# Patient Record
Sex: MALE | Race: WHITE | Employment: FULL TIME | URBAN - METROPOLITAN AREA
[De-identification: names, ages, dates, MRNs, and addresses within clinical notes are randomized per-mention and may not be internally consistent; named-entity substitution may affect disease eponyms.]

---

## 2018-12-12 ENCOUNTER — HOSPITAL ENCOUNTER (OUTPATIENT)
Dept: MRI IMAGING | Age: 63
Discharge: HOME | End: 2018-12-12
Attending: PHYSICAL MEDICINE & REHABILITATION
Payer: COMMERCIAL

## 2018-12-12 DIAGNOSIS — Z85.46 PERSONAL HISTORY OF PROSTATE CANCER: ICD-10-CM

## 2018-12-12 DIAGNOSIS — M54.50 LOW BACK PAIN: ICD-10-CM

## 2018-12-12 PROCEDURE — 72148 MRI LUMBAR SPINE W/O DYE: CPT

## 2023-07-12 ENCOUNTER — ANESTHESIA (OUTPATIENT)
Facility: HOSPITAL | Age: 68
End: 2023-07-12
Payer: MEDICARE

## 2023-07-12 ENCOUNTER — HOSPITAL ENCOUNTER (OUTPATIENT)
Facility: HOSPITAL | Age: 68
Setting detail: OUTPATIENT SURGERY
Discharge: HOME | End: 2023-07-12
Attending: SPECIALIST | Admitting: SPECIALIST
Payer: MEDICARE

## 2023-07-12 ENCOUNTER — ANESTHESIA EVENT (OUTPATIENT)
Facility: HOSPITAL | Age: 68
End: 2023-07-12
Payer: MEDICARE

## 2023-07-12 VITALS
DIASTOLIC BLOOD PRESSURE: 84 MMHG | RESPIRATION RATE: 18 BRPM | HEART RATE: 68 BPM | WEIGHT: 202.38 LBS | HEIGHT: 72 IN | TEMPERATURE: 98.6 F | BODY MASS INDEX: 27.41 KG/M2 | OXYGEN SATURATION: 94 % | SYSTOLIC BLOOD PRESSURE: 121 MMHG

## 2023-07-12 LAB
GLUCOSE BLD STRIP.AUTO-MCNC: 147 MG/DL (ref 65–117)
SERVICE CMNT-IMP: ABNORMAL

## 2023-07-12 PROCEDURE — 88305 TISSUE EXAM BY PATHOLOGIST: CPT

## 2023-07-12 PROCEDURE — 3600007502: Performed by: SPECIALIST

## 2023-07-12 PROCEDURE — 6360000002 HC RX W HCPCS: Performed by: NURSE ANESTHETIST, CERTIFIED REGISTERED

## 2023-07-12 PROCEDURE — 7100000010 HC PHASE II RECOVERY - FIRST 15 MIN: Performed by: SPECIALIST

## 2023-07-12 PROCEDURE — 2709999900 HC NON-CHARGEABLE SUPPLY: Performed by: SPECIALIST

## 2023-07-12 PROCEDURE — 2580000003 HC RX 258: Performed by: NURSE ANESTHETIST, CERTIFIED REGISTERED

## 2023-07-12 PROCEDURE — 82962 GLUCOSE BLOOD TEST: CPT

## 2023-07-12 PROCEDURE — 7100000011 HC PHASE II RECOVERY - ADDTL 15 MIN: Performed by: SPECIALIST

## 2023-07-12 PROCEDURE — 3700000000 HC ANESTHESIA ATTENDED CARE: Performed by: SPECIALIST

## 2023-07-12 PROCEDURE — 2500000003 HC RX 250 WO HCPCS: Performed by: NURSE ANESTHETIST, CERTIFIED REGISTERED

## 2023-07-12 RX ORDER — LIDOCAINE HYDROCHLORIDE 20 MG/ML
INJECTION, SOLUTION EPIDURAL; INFILTRATION; INTRACAUDAL; PERINEURAL PRN
Status: DISCONTINUED | OUTPATIENT
Start: 2023-07-12 | End: 2023-07-12 | Stop reason: SDUPTHER

## 2023-07-12 RX ORDER — SODIUM CHLORIDE 9 MG/ML
INJECTION, SOLUTION INTRAVENOUS CONTINUOUS
Status: DISCONTINUED | OUTPATIENT
Start: 2023-07-12 | End: 2023-07-12 | Stop reason: HOSPADM

## 2023-07-12 RX ORDER — SODIUM CHLORIDE 0.9 % (FLUSH) 0.9 %
5-40 SYRINGE (ML) INJECTION PRN
Status: DISCONTINUED | OUTPATIENT
Start: 2023-07-12 | End: 2023-07-12 | Stop reason: HOSPADM

## 2023-07-12 RX ORDER — PROPOFOL 10 MG/ML
INJECTION, EMULSION INTRAVENOUS PRN
Status: DISCONTINUED | OUTPATIENT
Start: 2023-07-12 | End: 2023-07-12 | Stop reason: SDUPTHER

## 2023-07-12 RX ORDER — 0.9 % SODIUM CHLORIDE 0.9 %
INTRAVENOUS SOLUTION INTRAVENOUS PRN
Status: DISCONTINUED | OUTPATIENT
Start: 2023-07-12 | End: 2023-07-12 | Stop reason: SDUPTHER

## 2023-07-12 RX ADMIN — PROPOFOL 50 MG: 10 INJECTION, EMULSION INTRAVENOUS at 09:42

## 2023-07-12 RX ADMIN — SODIUM CHLORIDE 200 ML: 9 INJECTION, SOLUTION INTRAVENOUS at 09:52

## 2023-07-12 RX ADMIN — PROPOFOL 40 MG: 10 INJECTION, EMULSION INTRAVENOUS at 09:44

## 2023-07-12 RX ADMIN — PROPOFOL 150 MCG/KG/MIN: 10 INJECTION, EMULSION INTRAVENOUS at 09:43

## 2023-07-12 RX ADMIN — LIDOCAINE HYDROCHLORIDE 100 MG: 20 INJECTION, SOLUTION EPIDURAL; INFILTRATION; INTRACAUDAL; PERINEURAL at 09:42

## 2023-07-12 ASSESSMENT — LIFESTYLE VARIABLES: SMOKING_STATUS: 1

## 2023-07-12 ASSESSMENT — PAIN - FUNCTIONAL ASSESSMENT: PAIN_FUNCTIONAL_ASSESSMENT: NONE - DENIES PAIN

## 2023-07-12 NOTE — H&P
Pre-Endoscopy H&P Update  Chief complaint/HPI/ROS:  The indication for the procedure, the patient's history and the patient's current medications are reviewed prior to the procedure and that data is reported on the H&P to which this document is attached. Any significant complaints with regard to organ systems will be noted, and if not mentioned then a review of systems is not contributory. Past Medical History:   Diagnosis Date    Arthritis     Diabetes mellitus (HCC)     Elevated cholesterol     GERD (gastroesophageal reflux disease)     Prostate cancer ([address])     Thyroid disease       Past Surgical History:   Procedure Laterality Date    COLONOSCOPY      x2    HERNIA REPAIR      with mesh    PROSTATECTOMY       Social   Social History     Tobacco Use    Smoking status: Every Day     Packs/day: 1.50     Years: 50.00     Pack years: 75.00     Types: Cigarettes    Smokeless tobacco: Never   Substance Use Topics    Alcohol use: Not Currently      Family History   Problem Relation Age of Onset    Heart Surgery Father       No Known Allergies   Prior to Admission Medications   Prescriptions Last Dose Informant Patient Reported? Taking? Ascorbic Acid (VITAMIN C) 1000 MG tablet 7/11/2023  Yes No   Sig: Take 1 tablet by mouth daily   B Complex Vitamins (VITAMIN B COMPLEX) CAPS 7/11/2023  Yes No   Sig: Take 1 capsule by mouth daily   Cholecalciferol (VITAMIN D3) 125 MCG (5000 UT) CAPS 7/11/2023  Yes No   Sig: Take 1 capsule by mouth daily   Cyanocobalamin (VITAMIN B-12) 5000 MCG SUBL 7/11/2023  Yes No   Sig: Place 5,000 mcg under the tongue daily   GLUCOSAMINE-CHONDROITIN-MSM PO 7/11/2023  Yes No   Sig: Take by mouth Takes one po once daily. Methylsulfonylmethane (MSM) 1500 MG TABS 7/11/2023  Yes No   Sig: Take 1,500 mg by mouth daily   Multiple Vitamins-Minerals (ONE-A-DAY MENS 50+ PO) 7/11/2023  Yes No   Sig: Take by mouth Takes one po once daily.    aspirin 81 MG EC tablet 7/11/2023  Yes No   Sig: Take 1 tablet by

## 2023-07-12 NOTE — H&P
Date: 2023 9:40 AM  Patient Name: Dolores Julien  Account #: [id number]  Gender: Male   (age): 1955 (78)  Provider:    Yamilex Bethea NP     Referring Physician:    Elvis Estrada MD  [address] [place] [address]  [phone] (phone)  [phone] (fax)     Chief Complaint:    RUQ abdominal pain/abdominal bloating     History of Present Illness:  -- OV 11/15/22    Mr Em Martinez presents today regarding increased gas and belching. The belching has slowly worsened over the past 3-6 months. Gets rumbling in his chest that feels like a heart attack that scares him, will belch and feels better. - frequently belching during clinic visit. He does not follow a healthy well balanced diet - does not eat any fruit or vegetables. Decided he would take the supplement Balance of [employer], to compensate for lack of fruits and vegetables, was taking 1 capsule/day and his symptoms worsened. He only took for 3 days. His symptoms have worsened since that then. He drinks sodas all day. No gum chewing. Smoke 1.5 ppd cigarettes for >40 yrs. Unable to tolerate milk or salads anymore. Denies changes in bowel movements. had daily formed BM. Has a \"sensitive\" gut. Takes 40mg pantoprazole daily, denies heartburn or pyrosis. Not interested in EGD at this time. INTERVAL UPDATE 23:    He continues to have episodes of vomiting. Taking Omeprazole 40mg daily. Still not interested in endoscopic evaluation- EGD. Has not stopped drinking diet Pepsi-drinks 3-4 per day. took a Linzess one night (not sure of dose), had good bowel movement and abdominal pain and bloating improved. Baseline movements occur every 3 days. He has tried fiber supplementation and OTC Miralax and stool softeners without relief symptoms before. Colonoscopy, 2016, Moderate diverticulosis of the sigmoid colon, Abnormal vascularity and erythema in the proximal descending colon compatible with vascular lesion.  (Biopsy),

## 2023-07-12 NOTE — PROGRESS NOTES
Endoscopy discharge instructions have been reviewed and given to patient. The patient verbalized understanding and acceptance of instructions. Dr. Asuncion Freeman discussed with Flaca Sinclair procedure findings and next steps.

## 2023-07-12 NOTE — ANESTHESIA POSTPROCEDURE EVALUATION
Department of Anesthesiology  Postprocedure Note    Patient: Harper Vasquez  MRN: [id number]  YOB: 1955  Date of evaluation: 7/12/2023      Procedure Summary     Date: 07/12/23 Room / Location: [place] [address] / [facility] ENDOSCOPY    Anesthesia Start: 0938 Anesthesia Stop: Zainab Finneycarlospedro    Procedure: EGD BIOPSY Diagnosis:       Abdominal pain, unspecified abdominal location      (Abdominal pain, unspecified abdominal location [R10.9])    Surgeons: Wynema Kawasaki, MD Responsible Provider: Camilla Smith MD    Anesthesia Type: MAC ASA Status: 3          Anesthesia Type: MAC    Edmundo Phase I: Edmundo Score: 10    Edmundo Phase II: Edmundo Score: 10      Anesthesia Post Evaluation    Patient location during evaluation: PACU  Level of consciousness: awake and alert and awake  Pain score: 0  Airway patency: patent  Nausea & Vomiting: no nausea and no vomiting  Complications: no  Cardiovascular status: hemodynamically stable  Respiratory status: acceptable  Hydration status: stable

## 2023-07-12 NOTE — DISCHARGE INSTRUCTIONS
[address] [facility] VESTA Whitaker MD  [phone]      July 12, 2023     [facility]  YOB: 1955    ENDOSCOPY DISCHARGE INSTRUCTIONS    If there is redness at IV site you should apply warm compress to area. If redness or soreness persist contact Dr. Rony Whitaker' or your primary care doctor. Gaseous discomfort may develop, but walking, belching will help relieve this. You may not operate a vehicle for 12 hours  You may not operate machinery or dangerous appliances for rest of today  You may not drink alcoholic beverages for 12 hours  Avoid making any critical decisions for 24 hours    DIET:  You may resume your normal diet, but some patients find that heavy or large meals may lead to indigestion or vomiting. I suggest a light meal as first food intake. MEDICATIONS:  The use of some over-the-counter pain medication may lead to bleeding after biopsies or other procedures you may have had done. Tylenol (also called acetaminophen) is safe to take and will not lead to bleeding. Based on the procedure you had today you may not safely take aspirin or aspirin-like products for the next ten (10) days. ACTIVITY:  You may resume your normal household activities, but it is recommended that you spend the remainder of the day resting -  avoid any strenuous activity. CALL DR. Rony Whitaker' OFFICE IF:  Increasing pain, nausea, vomiting  Abdominal distension (swelling)  Significant new or increased bleeding (oral or rectal)  Fever/Chills  Chest pain/shortness of breath                   Additional instructions: We found that the esophagus and small intestine are normal but there was a small stomach ulcer which I biopsied. You should be taking pantoprazole as this should heal the ulcer. I will contact you with the biopsy results in 10-14 days. It was an honor to be your doctor today.   Please let me or my office staff know

## 2023-07-12 NOTE — OP NOTE
[address] [place], [place]  [phone]      2023    Esophagogastroduodenoscopy (EGD) Procedure Note  Neida Adams  : 1955  [facility] Medical Record Number: [id number]      Indications:   Belching, gas. Referring Physician:  Donte Ford MD  Anesthesia/Sedation:  Conscious sedation/deep sedation/monitored anesthesia -- see notes. Endoscopist:  Dr. Alessandro Hogan  Complications:  None  Estimated Blood Loss:  None    Permit:  The indications, risks, benefits and alternatives were reviewed with the patient or their decision maker who was provided an opportunity to ask questions and all questions were answered. The specific risks of esophagogastroduodenoscopy with conscious sedation were reviewed, including but not limited to anesthetic complication, bleeding, adverse drug reaction, missed lesion, infection, IV site reactions, and intestinal perforation which would lead to the need for surgical repair. Alternatives to EGD including radiographic imaging, observation without testing, or laboratory testing were reviewed as well as the limitations of those alternatives discussed. After considering the options and having all their questions answered, the patient or their decision maker provided both verbal and written consent to proceed. Procedure in Detail:  After obtaining informed consent, positioning of the patient in the left lateral decubitus position, and conduction of a pre-procedure pause or \"time out\" the endoscope was introduced into the mouth and advanced to the duodenum. A careful inspection was made, and findings or interventions are described below. Findings:   Esophagus:normal.  Cold forceps biopsies of mid esophagus taken for histology. Stomach: Striped erythema of the antrum with a small punctate 3mm ulcer.   Cold forceps biopsies obtained for

## 2023-07-12 NOTE — PROGRESS NOTES
Johanny Gutierrez  1955  [id number]    Situation:  Verbal report received from: [address] [facility]  Procedure: Procedure(s):  EGD BIOPSY     Background:    Preoperative diagnosis: Abdominal pain, unspecified abdominal location [R10.9]   Postoperative diagnosis: * No post-op diagnosis entered *     :  Dr. Duke Jones  Assistant(s): Circulator: Walker Guillermo RN  Endoscopy Technician: Selin Tomas     Specimens:   ID Type Source Tests Collected by Time Destination   A : gastric antrum bx Tissue Gastric SURGICAL PATHOLOGY Tucker Paul MD 7/12/2023 2600    B : duodenum bx Tissue Duodenum SURGICAL PATHOLOGY Tucker Paul MD 7/12/2023 6787    C : mid esophagus bx Tissue Esophagus SURGICAL PATHOLOGY Tucker Paul MD 7/12/2023 0948       H. Pylori  No    Assessment:  Intra-procedure medications     Anesthesia gave intra-procedure sedation and medications, see anesthesia flow sheet Yes    Intravenous fluids: NS@ KVO     Vital signs stable yes    Abdominal assessment: round and soft yes    Recommendation:  Discharge patient per MD order yes.   Return to floor na  Family or Friend family  Permission to share finding with family or friend Yes

## 2023-07-12 NOTE — PROGRESS NOTES

## (undated) DEVICE — 1200 GUARD II KIT W/5MM TUBE W/O VAC TUBE: Brand: GUARDIAN

## (undated) DEVICE — BITEBLOCK ENDOSCP 60FR MAXI WHT POLYETH STURDY W/ VELC WVN

## (undated) DEVICE — CATHETER IV 22GA L1IN OD0.8382-0.9144MM ID0.6096-0.6858MM

## (undated) DEVICE — SET ADMIN 16ML TBNG L100IN 2 Y INJ SITE IV PIGGY BK DISP (ORDER IN MULIPLES OF 48)

## (undated) DEVICE — BLUNTFILL WITH FILTER: Brand: MONOJECT

## (undated) DEVICE — BASIN EMSIS 16OZ GRAPHITE PLAS KID SHP MOLD GRAD FOR ORAL

## (undated) DEVICE — IV STRT KT 3282] LSL INDUSTRIES INC]

## (undated) DEVICE — SOLIDIFIER FLD 2OZ 1500CC N DISINF IN BTL DISP SAFESORB

## (undated) DEVICE — SYRINGE MED 5ML STD CLR PLAS LUERLOCK TIP N CTRL DISP

## (undated) DEVICE — ELECTRODE,RADIOTRANSLUCENT,FOAM,3PK: Brand: MEDLINE

## (undated) DEVICE — SYRINGE MED 3ML CLR PLAS STD N CTRL LUERLOCK TIP DISP

## (undated) DEVICE — KIT COLON W/ 1.1OZ LUB AND 2 END

## (undated) DEVICE — BLUNTFILL: Brand: MONOJECT

## (undated) DEVICE — CANNULA CUSH AD W/ 14FT TBG